# Patient Record
Sex: FEMALE
[De-identification: names, ages, dates, MRNs, and addresses within clinical notes are randomized per-mention and may not be internally consistent; named-entity substitution may affect disease eponyms.]

---

## 2018-07-12 ENCOUNTER — APPOINTMENT (OUTPATIENT)
Dept: ANTEPARTUM | Facility: CLINIC | Age: 33
End: 2018-07-12
Payer: COMMERCIAL

## 2018-07-12 ENCOUNTER — ASOB RESULT (OUTPATIENT)
Age: 33
End: 2018-07-12

## 2018-07-12 PROCEDURE — 76801 OB US < 14 WKS SINGLE FETUS: CPT

## 2018-07-12 PROCEDURE — 36416 COLLJ CAPILLARY BLOOD SPEC: CPT

## 2018-07-12 PROCEDURE — 76813 OB US NUCHAL MEAS 1 GEST: CPT

## 2018-08-31 ENCOUNTER — APPOINTMENT (OUTPATIENT)
Dept: ANTEPARTUM | Facility: CLINIC | Age: 33
End: 2018-08-31
Payer: COMMERCIAL

## 2018-08-31 ENCOUNTER — ASOB RESULT (OUTPATIENT)
Age: 33
End: 2018-08-31

## 2018-08-31 PROCEDURE — 76811 OB US DETAILED SNGL FETUS: CPT

## 2018-12-19 ENCOUNTER — ASOB RESULT (OUTPATIENT)
Age: 33
End: 2018-12-19

## 2018-12-19 ENCOUNTER — APPOINTMENT (OUTPATIENT)
Dept: ANTEPARTUM | Facility: CLINIC | Age: 33
End: 2018-12-19
Payer: COMMERCIAL

## 2018-12-19 PROCEDURE — 76819 FETAL BIOPHYS PROFIL W/O NST: CPT

## 2018-12-19 PROCEDURE — 76816 OB US FOLLOW-UP PER FETUS: CPT

## 2019-01-11 ENCOUNTER — ASOB RESULT (OUTPATIENT)
Age: 34
End: 2019-01-11

## 2019-01-11 ENCOUNTER — APPOINTMENT (OUTPATIENT)
Age: 34
End: 2019-01-11

## 2019-01-11 ENCOUNTER — APPOINTMENT (OUTPATIENT)
Dept: ANTEPARTUM | Facility: CLINIC | Age: 34
End: 2019-01-11
Payer: COMMERCIAL

## 2019-01-11 PROCEDURE — 76816 OB US FOLLOW-UP PER FETUS: CPT

## 2019-01-11 PROCEDURE — 76818 FETAL BIOPHYS PROFILE W/NST: CPT | Mod: 26

## 2019-01-15 ENCOUNTER — TRANSCRIPTION ENCOUNTER (OUTPATIENT)
Age: 34
End: 2019-01-15

## 2019-01-15 ENCOUNTER — INPATIENT (INPATIENT)
Facility: HOSPITAL | Age: 34
LOS: 1 days | Discharge: HOME | End: 2019-01-17
Attending: OBSTETRICS & GYNECOLOGY | Admitting: OBSTETRICS & GYNECOLOGY

## 2019-01-15 VITALS — HEIGHT: 65 IN | WEIGHT: 152.12 LBS

## 2019-01-15 DIAGNOSIS — Z3A.00 WEEKS OF GESTATION OF PREGNANCY NOT SPECIFIED: ICD-10-CM

## 2019-01-15 DIAGNOSIS — O26.90 PREGNANCY RELATED CONDITIONS, UNSPECIFIED, UNSPECIFIED TRIMESTER: ICD-10-CM

## 2019-01-15 LAB
BASOPHILS # BLD AUTO: 0.05 K/UL — SIGNIFICANT CHANGE UP (ref 0–0.2)
BASOPHILS NFR BLD AUTO: 0.4 % — SIGNIFICANT CHANGE UP (ref 0–2)
BLD GP AB SCN SERPL QL: NEGATIVE — SIGNIFICANT CHANGE UP
EOSINOPHIL # BLD AUTO: 0.02 K/UL — SIGNIFICANT CHANGE UP (ref 0–0.5)
EOSINOPHIL NFR BLD AUTO: 0.2 % — SIGNIFICANT CHANGE UP (ref 0–6)
HCT VFR BLD CALC: 34.3 % — LOW (ref 34.5–45)
HGB BLD-MCNC: 10.5 G/DL — LOW (ref 11.5–15.5)
IMM GRANULOCYTES NFR BLD AUTO: 0.8 % — SIGNIFICANT CHANGE UP (ref 0–1.5)
LYMPHOCYTES # BLD AUTO: 2.7 K/UL — SIGNIFICANT CHANGE UP (ref 1–3.3)
LYMPHOCYTES # BLD AUTO: 20.8 % — SIGNIFICANT CHANGE UP (ref 13–44)
MCHC RBC-ENTMCNC: 25.8 PG — LOW (ref 27–34)
MCHC RBC-ENTMCNC: 30.6 % — LOW (ref 32–36)
MCV RBC AUTO: 84.3 FL — SIGNIFICANT CHANGE UP (ref 80–100)
MONOCYTES # BLD AUTO: 0.85 K/UL — SIGNIFICANT CHANGE UP (ref 0–0.9)
MONOCYTES NFR BLD AUTO: 6.5 % — SIGNIFICANT CHANGE UP (ref 2–14)
NEUTROPHILS # BLD AUTO: 9.27 K/UL — HIGH (ref 1.8–7.4)
NEUTROPHILS NFR BLD AUTO: 71.3 % — SIGNIFICANT CHANGE UP (ref 43–77)
NRBC # FLD: 0 K/UL — LOW (ref 25–125)
PLATELET # BLD AUTO: 415 K/UL — HIGH (ref 150–400)
PMV BLD: 9.6 FL — SIGNIFICANT CHANGE UP (ref 7–13)
RBC # BLD: 4.07 M/UL — SIGNIFICANT CHANGE UP (ref 3.8–5.2)
RBC # FLD: 16.5 % — HIGH (ref 10.3–14.5)
RH IG SCN BLD-IMP: POSITIVE — SIGNIFICANT CHANGE UP
WBC # BLD: 12.99 K/UL — HIGH (ref 3.8–10.5)
WBC # FLD AUTO: 12.99 K/UL — HIGH (ref 3.8–10.5)

## 2019-01-15 RX ORDER — OXYTOCIN 10 UNIT/ML
41.67 VIAL (ML) INJECTION
Qty: 20 | Refills: 0 | Status: DISCONTINUED | OUTPATIENT
Start: 2019-01-15 | End: 2019-01-15

## 2019-01-15 RX ORDER — SODIUM CHLORIDE 9 MG/ML
3 INJECTION INTRAMUSCULAR; INTRAVENOUS; SUBCUTANEOUS EVERY 8 HOURS
Qty: 0 | Refills: 0 | Status: DISCONTINUED | OUTPATIENT
Start: 2019-01-15 | End: 2019-01-15

## 2019-01-15 RX ORDER — IBUPROFEN 200 MG
600 TABLET ORAL EVERY 6 HOURS
Qty: 0 | Refills: 0 | Status: COMPLETED | OUTPATIENT
Start: 2019-01-15 | End: 2019-12-14

## 2019-01-15 RX ORDER — MAGNESIUM HYDROXIDE 400 MG/1
30 TABLET, CHEWABLE ORAL
Qty: 0 | Refills: 0 | Status: DISCONTINUED | OUTPATIENT
Start: 2019-01-15 | End: 2019-01-17

## 2019-01-15 RX ORDER — GLYCERIN ADULT
1 SUPPOSITORY, RECTAL RECTAL AT BEDTIME
Qty: 0 | Refills: 0 | Status: DISCONTINUED | OUTPATIENT
Start: 2019-01-15 | End: 2019-01-17

## 2019-01-15 RX ORDER — SODIUM CHLORIDE 9 MG/ML
1000 INJECTION, SOLUTION INTRAVENOUS ONCE
Qty: 0 | Refills: 0 | Status: DISCONTINUED | OUTPATIENT
Start: 2019-01-15 | End: 2019-01-15

## 2019-01-15 RX ORDER — DOCUSATE SODIUM 100 MG
100 CAPSULE ORAL
Qty: 0 | Refills: 0 | Status: DISCONTINUED | OUTPATIENT
Start: 2019-01-15 | End: 2019-01-17

## 2019-01-15 RX ORDER — DIBUCAINE 1 %
1 OINTMENT (GRAM) RECTAL EVERY 4 HOURS
Qty: 0 | Refills: 0 | Status: DISCONTINUED | OUTPATIENT
Start: 2019-01-15 | End: 2019-01-17

## 2019-01-15 RX ORDER — LEVOTHYROXINE SODIUM 125 MCG
50 TABLET ORAL DAILY
Qty: 0 | Refills: 0 | Status: DISCONTINUED | OUTPATIENT
Start: 2019-01-15 | End: 2019-01-17

## 2019-01-15 RX ORDER — ACETAMINOPHEN 500 MG
975 TABLET ORAL EVERY 6 HOURS
Qty: 0 | Refills: 0 | Status: DISCONTINUED | OUTPATIENT
Start: 2019-01-15 | End: 2019-01-17

## 2019-01-15 RX ORDER — OXYCODONE HYDROCHLORIDE 5 MG/1
5 TABLET ORAL EVERY 4 HOURS
Qty: 0 | Refills: 0 | Status: DISCONTINUED | OUTPATIENT
Start: 2019-01-15 | End: 2019-01-17

## 2019-01-15 RX ORDER — KETOROLAC TROMETHAMINE 30 MG/ML
30 SYRINGE (ML) INJECTION ONCE
Qty: 0 | Refills: 0 | Status: DISCONTINUED | OUTPATIENT
Start: 2019-01-15 | End: 2019-01-15

## 2019-01-15 RX ORDER — SIMETHICONE 80 MG/1
80 TABLET, CHEWABLE ORAL EVERY 6 HOURS
Qty: 0 | Refills: 0 | Status: DISCONTINUED | OUTPATIENT
Start: 2019-01-15 | End: 2019-01-17

## 2019-01-15 RX ORDER — AMPICILLIN TRIHYDRATE 250 MG
2 CAPSULE ORAL ONCE
Qty: 0 | Refills: 0 | Status: DISCONTINUED | OUTPATIENT
Start: 2019-01-15 | End: 2019-01-15

## 2019-01-15 RX ORDER — HYDROCORTISONE 1 %
1 OINTMENT (GRAM) TOPICAL EVERY 4 HOURS
Qty: 0 | Refills: 0 | Status: DISCONTINUED | OUTPATIENT
Start: 2019-01-15 | End: 2019-01-17

## 2019-01-15 RX ORDER — HYDROCORTISONE 1 %
1 OINTMENT (GRAM) TOPICAL EVERY 4 HOURS
Qty: 0 | Refills: 0 | Status: DISCONTINUED | OUTPATIENT
Start: 2019-01-15 | End: 2019-01-15

## 2019-01-15 RX ORDER — ACETAMINOPHEN 500 MG
975 TABLET ORAL EVERY 6 HOURS
Qty: 0 | Refills: 0 | Status: COMPLETED | OUTPATIENT
Start: 2019-01-15 | End: 2019-12-14

## 2019-01-15 RX ORDER — IBUPROFEN 200 MG
600 TABLET ORAL EVERY 6 HOURS
Qty: 0 | Refills: 0 | Status: DISCONTINUED | OUTPATIENT
Start: 2019-01-15 | End: 2019-01-17

## 2019-01-15 RX ORDER — DIBUCAINE 1 %
1 OINTMENT (GRAM) RECTAL EVERY 4 HOURS
Qty: 0 | Refills: 0 | Status: DISCONTINUED | OUTPATIENT
Start: 2019-01-15 | End: 2019-01-15

## 2019-01-15 RX ORDER — SODIUM CHLORIDE 9 MG/ML
1000 INJECTION, SOLUTION INTRAVENOUS
Qty: 0 | Refills: 0 | Status: DISCONTINUED | OUTPATIENT
Start: 2019-01-15 | End: 2019-01-15

## 2019-01-15 RX ORDER — SODIUM CHLORIDE 9 MG/ML
3 INJECTION INTRAMUSCULAR; INTRAVENOUS; SUBCUTANEOUS EVERY 8 HOURS
Qty: 0 | Refills: 0 | Status: DISCONTINUED | OUTPATIENT
Start: 2019-01-15 | End: 2019-01-17

## 2019-01-15 RX ORDER — PRAMOXINE HYDROCHLORIDE 150 MG/15G
1 AEROSOL, FOAM RECTAL EVERY 4 HOURS
Qty: 0 | Refills: 0 | Status: DISCONTINUED | OUTPATIENT
Start: 2019-01-15 | End: 2019-01-17

## 2019-01-15 RX ORDER — LANOLIN
1 OINTMENT (GRAM) TOPICAL EVERY 6 HOURS
Qty: 0 | Refills: 0 | Status: DISCONTINUED | OUTPATIENT
Start: 2019-01-15 | End: 2019-01-17

## 2019-01-15 RX ORDER — AER TRAVELER 0.5 G/1
1 SOLUTION RECTAL; TOPICAL EVERY 4 HOURS
Qty: 0 | Refills: 0 | Status: DISCONTINUED | OUTPATIENT
Start: 2019-01-15 | End: 2019-01-15

## 2019-01-15 RX ORDER — OXYCODONE HYDROCHLORIDE 5 MG/1
5 TABLET ORAL
Qty: 0 | Refills: 0 | Status: DISCONTINUED | OUTPATIENT
Start: 2019-01-15 | End: 2019-01-17

## 2019-01-15 RX ORDER — TETANUS TOXOID, REDUCED DIPHTHERIA TOXOID AND ACELLULAR PERTUSSIS VACCINE, ADSORBED 5; 2.5; 8; 8; 2.5 [IU]/.5ML; [IU]/.5ML; UG/.5ML; UG/.5ML; UG/.5ML
0.5 SUSPENSION INTRAMUSCULAR ONCE
Qty: 0 | Refills: 0 | Status: DISCONTINUED | OUTPATIENT
Start: 2019-01-15 | End: 2019-01-17

## 2019-01-15 RX ORDER — DIPHENHYDRAMINE HCL 50 MG
25 CAPSULE ORAL EVERY 6 HOURS
Qty: 0 | Refills: 0 | Status: DISCONTINUED | OUTPATIENT
Start: 2019-01-15 | End: 2019-01-17

## 2019-01-15 RX ORDER — PRAMOXINE HYDROCHLORIDE 150 MG/15G
1 AEROSOL, FOAM RECTAL EVERY 4 HOURS
Qty: 0 | Refills: 0 | Status: DISCONTINUED | OUTPATIENT
Start: 2019-01-15 | End: 2019-01-15

## 2019-01-15 RX ORDER — AMPICILLIN TRIHYDRATE 250 MG
CAPSULE ORAL
Qty: 0 | Refills: 0 | Status: DISCONTINUED | OUTPATIENT
Start: 2019-01-15 | End: 2019-01-15

## 2019-01-15 RX ORDER — AER TRAVELER 0.5 G/1
1 SOLUTION RECTAL; TOPICAL EVERY 4 HOURS
Qty: 0 | Refills: 0 | Status: DISCONTINUED | OUTPATIENT
Start: 2019-01-15 | End: 2019-01-17

## 2019-01-15 RX ORDER — OXYTOCIN 10 UNIT/ML
333.33 VIAL (ML) INJECTION
Qty: 20 | Refills: 0 | Status: DISCONTINUED | OUTPATIENT
Start: 2019-01-15 | End: 2019-01-15

## 2019-01-15 RX ORDER — AMPICILLIN TRIHYDRATE 250 MG
1 CAPSULE ORAL EVERY 4 HOURS
Qty: 0 | Refills: 0 | Status: DISCONTINUED | OUTPATIENT
Start: 2019-01-15 | End: 2019-01-15

## 2019-01-15 RX ADMIN — Medication 125 MILLIUNIT(S)/MIN: at 07:50

## 2019-01-15 RX ADMIN — Medication 975 MILLIGRAM(S): at 18:13

## 2019-01-15 RX ADMIN — Medication 600 MILLIGRAM(S): at 18:12

## 2019-01-15 RX ADMIN — Medication 1 APPLICATION(S): at 11:29

## 2019-01-15 RX ADMIN — Medication 50 MICROGRAM(S): at 08:40

## 2019-01-15 RX ADMIN — Medication 30 MILLIGRAM(S): at 06:55

## 2019-01-15 RX ADMIN — Medication 600 MILLIGRAM(S): at 17:11

## 2019-01-15 RX ADMIN — PRAMOXINE HYDROCHLORIDE 1 APPLICATION(S): 150 AEROSOL, FOAM RECTAL at 11:30

## 2019-01-15 RX ADMIN — Medication 30 MILLIGRAM(S): at 07:50

## 2019-01-15 RX ADMIN — Medication 1 APPLICATION(S): at 11:30

## 2019-01-15 RX ADMIN — SODIUM CHLORIDE 3 MILLILITER(S): 9 INJECTION INTRAMUSCULAR; INTRAVENOUS; SUBCUTANEOUS at 22:00

## 2019-01-15 RX ADMIN — Medication 975 MILLIGRAM(S): at 17:10

## 2019-01-15 NOTE — DISCHARGE NOTE OB - ABILITY TO HEAR (WITH HEARING AID OR HEARING APPLIANCE IF NORMALLY USED):
Faxed refill request from: [facility]   Medication requested: Hydroxychloroquine   Prescription Written: 2/01/2016  Last filled: 12/26/2016  Last qty: 30 with five refills   Pt's last office visit: 2/01/2016  Next scheduled office visit: 02/06/2017  Last ophthalmology visit: 11/29/2016      WBC   Date Value Ref Range Status   09/02/2015 7.1 4.0 - 11.0 10e9/L Final     RBC COUNT   Date Value Ref Range Status   09/02/2015 3.20* 3.8 - 5.2 10e12/L Final     HEMOGLOBIN   Date Value Ref Range Status   12/15/2016 12.3 11.7 - 15.7 g/dL Final     HEMATOCRIT   Date Value Ref Range Status   09/02/2015 29.8* 35.0 - 47.0 % Final     MCV   Date Value Ref Range Status   09/02/2015 93 78 - 100 fl Final     MCH   Date Value Ref Range Status   09/02/2015 30.3 26.5 - 33.0 pg Final     MCHC   Date Value Ref Range Status   09/02/2015 32.6 31.5 - 36.5 g/dL Final     RDW   Date Value Ref Range Status   09/02/2015 14.7 10.0 - 15.0 % Final     PLATELET COUNT   Date Value Ref Range Status   09/02/2015 326 150 - 450 10e9/L Final     AST   Date Value Ref Range Status   08/22/2015 26 0 - 45 U/L Final     ALT   Date Value Ref Range Status   08/22/2015 22 0 - 50 U/L Final     CREATININE   Date Value Ref Range Status   12/27/2016 1.03 0.52 - 1.04 mg/dL Final   04/07/2005 0.93 0.60 - 1.30 mg/dL Final     ALBUMIN   Date Value Ref Range Status   12/15/2016 3.4 3.4 - 5.0 g/dL Final     COLOR URINE (no units)   Date Value   12/27/2016 Yellow     APPEARANCE URINE (no units)   Date Value   12/27/2016 Clear     GLUCOSE URINE (mg/dL)   Date Value   12/27/2016 Negative     BILIRUBIN URINE (no units)   Date Value   12/27/2016 Negative     KETONES URINE (mg/dL)   Date Value   12/27/2016 Negative     SPECIFIC GRAVITY URINE (no units)   Date Value   12/27/2016 1.025     PH URINE (pH)   Date Value   12/27/2016 6.5     PROTEIN ALBUMIN URINE (mg/dL)   Date Value   12/27/2016 30*     UROBILINOGEN URINE (EU/dL)   Date Value   12/27/2016 0.2     NITRITE URINE  (no units)   Date Value   12/27/2016 Negative     LEUKOCYTE ESTERASE URINE (no units)   Date Value   12/27/2016 Negative                Adequate: hears normal conversation without difficulty

## 2019-01-15 NOTE — DISCHARGE NOTE OB - CARE PLAN
Principal Discharge DX:	Vaginal delivery  Goal:	return to normal ADLs  Assessment and plan of treatment:	stable; vaginal rest

## 2019-01-15 NOTE — DISCHARGE NOTE OB - PATIENT PORTAL LINK FT
You can access the [facility] Portal, offered by [facility], by registering with the following website: [email or web address]

## 2019-01-15 NOTE — DISCHARGE NOTE OB - CARE PROVIDER_API CALL
Carolyn De Paz), Obstetrics and Gynecology  [address] [place] [address]  Phone: [phone]  Fax: [phone]

## 2019-01-16 LAB — T PALLIDUM AB TITR SER: NEGATIVE — SIGNIFICANT CHANGE UP

## 2019-01-16 RX ADMIN — Medication 975 MILLIGRAM(S): at 22:00

## 2019-01-16 RX ADMIN — Medication 600 MILLIGRAM(S): at 00:00

## 2019-01-16 RX ADMIN — Medication 600 MILLIGRAM(S): at 16:00

## 2019-01-16 RX ADMIN — Medication 975 MILLIGRAM(S): at 00:00

## 2019-01-16 RX ADMIN — Medication 975 MILLIGRAM(S): at 23:00

## 2019-01-16 RX ADMIN — Medication 600 MILLIGRAM(S): at 00:30

## 2019-01-16 RX ADMIN — Medication 600 MILLIGRAM(S): at 08:45

## 2019-01-16 RX ADMIN — Medication 975 MILLIGRAM(S): at 15:12

## 2019-01-16 RX ADMIN — Medication 600 MILLIGRAM(S): at 15:12

## 2019-01-16 RX ADMIN — Medication 600 MILLIGRAM(S): at 09:30

## 2019-01-16 RX ADMIN — Medication 1 TABLET(S): at 18:43

## 2019-01-16 RX ADMIN — Medication 50 MICROGRAM(S): at 07:48

## 2019-01-16 RX ADMIN — Medication 600 MILLIGRAM(S): at 23:00

## 2019-01-16 RX ADMIN — Medication 975 MILLIGRAM(S): at 09:30

## 2019-01-16 RX ADMIN — Medication 975 MILLIGRAM(S): at 08:45

## 2019-01-16 RX ADMIN — SODIUM CHLORIDE 3 MILLILITER(S): 9 INJECTION INTRAMUSCULAR; INTRAVENOUS; SUBCUTANEOUS at 05:52

## 2019-01-16 RX ADMIN — Medication 975 MILLIGRAM(S): at 00:30

## 2019-01-16 RX ADMIN — Medication 600 MILLIGRAM(S): at 22:00

## 2019-01-16 RX ADMIN — Medication 975 MILLIGRAM(S): at 16:00

## 2019-01-16 NOTE — LACTATION INITIAL EVALUATION - LACTATION INTERVENTIONS
Pt. declining assistance at this time. Pt. states she is doing both breast and bottle feeding. Recommended more breastfeeding and less formula feeding. Supplementation risks discussed. Strategies reviewed on getting baby on breast more often. Instructed mother in use of breastfeeding log to document feeds along with wet and dirty diapers. Verbalized understanding. Pt. informed of availability of lactation through the night and encouraged to call for assistance prn.

## 2019-01-17 VITALS
HEART RATE: 74 BPM | SYSTOLIC BLOOD PRESSURE: 113 MMHG | DIASTOLIC BLOOD PRESSURE: 73 MMHG | RESPIRATION RATE: 18 BRPM | OXYGEN SATURATION: 100 % | TEMPERATURE: 98 F

## 2019-01-17 RX ORDER — IBUPROFEN 200 MG
1 TABLET ORAL
Qty: 0 | Refills: 0 | COMMUNITY
Start: 2019-01-17

## 2019-01-17 RX ORDER — ACETAMINOPHEN 500 MG
3 TABLET ORAL
Qty: 0 | Refills: 0 | COMMUNITY
Start: 2019-01-17

## 2019-01-17 RX ADMIN — Medication 975 MILLIGRAM(S): at 07:34

## 2019-01-17 RX ADMIN — Medication 600 MILLIGRAM(S): at 07:34

## 2019-01-17 RX ADMIN — Medication 975 MILLIGRAM(S): at 06:34

## 2019-01-17 RX ADMIN — Medication 600 MILLIGRAM(S): at 06:34

## 2019-01-17 NOTE — PROGRESS NOTE ADULT - SUBJECTIVE AND OBJECTIVE BOX
Patient assessed at 0732.  Subjective  Pain: Patient report body soreness that is being managed well by pain management protocol  Complaints:  None  Milestones: Alert and orientedx3. Out of bed ambulating. Voiding. Positive bowel movement. Tolerating a regular diet.  Infant feeding:     breastfeeding                       Feeding related issues or concerns: had issues with latching with last child due to slightly flat nipples. Patient states she ended up expressing breastmilk with breast pump. Patient refused lactation at this time.    Objective   Vital Signs:  Vital Signs Last 24 Hrs  T(C): 36.9 (2019 06:27), Max: 36.9 (15 Simon 2019 14:00)  T(F): 98.4 (2019 06:27), Max: 98.4 (15 Simon 2019 14:00)  HR: 67 (2019 06:27) (67 - 96)  BP: 91/66 (2019 06:27) (91/66 - 111/66)  BP(mean): --  RR: 18 (2019 06:27) (18 - 18)  SpO2: 100% (2019 06:27) (98% - 100%)    Labs:                        10.5   12.99 )-----------( 415      ( 15 Simon 2019 06:00 )             34.3     Blood Type: Apositive  Rubella: Immune  RPR: nonreactive    Medications  MEDICATIONS  (STANDING):  acetaminophen   Tablet .. 975 milliGRAM(s) Oral every 6 hours  diphtheria/tetanus/pertussis (acellular) Vaccine (ADAcel) 0.5 milliLiter(s) IntraMuscular once  ibuprofen  Tablet. 600 milliGRAM(s) Oral every 6 hours  levothyroxine 50 MICROGram(s) Oral daily  oxyCODONE    IR 5 milliGRAM(s) Oral every 3 hours  prenatal multivitamin 1 Tablet(s) Oral daily  sodium chloride 0.9% lock flush 3 milliLiter(s) IV Push every 8 hours    MEDICATIONS  (PRN):  dibucaine 1% Ointment 1 Application(s) Topical every 4 hours PRN Perineal Discomfort  diphenhydrAMINE 25 milliGRAM(s) Oral every 6 hours PRN Itching  docusate sodium 100 milliGRAM(s) Oral two times a day PRN Stool Softening  glycerin Suppository - Adult 1 Suppository(s) Rectal at bedtime PRN Constipation  hydrocortisone 1% Cream 1 Application(s) Topical every 4 hours PRN perineal discomfort  lanolin Ointment 1 Application(s) Topical every 6 hours PRN Sore Nipples  magnesium hydroxide Suspension 30 milliLiter(s) Oral two times a day PRN Constipation  oxyCODONE    IR 5 milliGRAM(s) Oral every 4 hours PRN Severe Pain (7 -10)  pramoxine 1%/zinc 5% Cream 1 Application(s) Topical every 4 hours PRN perineal discomfort  simethicone 80 milliGRAM(s) Chew every 6 hours PRN Gas  witch hazel Pads 1 Application(s) Topical every 4 hours PRN Perineal Discomfort    Assessment  32y/o . Day #1 @ 0645  postpartum .  Assisted delivery (vacuum, forceps): vacuum  Indication for assisted delivery: abnormal FHR Cat II  Past medical history significant for hypothyroidism on synthroid  Current Issues: EBL 350cc  Lung sound clear bilaterally  Breasts: soft, nontender  Nipples: intact, slightly flat  Abdomen: Soft, nontender, nondistended. Fundus firm. Positive bowel sounds  Vagina: Lochia light rubra  Perineum:  Slight edema with no erythema noted  Laceration/episiotomy site: 2nd degree laceration  Lower extremities: No edema noted bilaterally of lower extremities. Nontender calves.  Negative Stephanie's sign. Positive pedal pulses.  Other relevant physical exam findings: none      Plan  Continue routine postpartum care.   Increase ambulation, analgesia PRN and pain medication protocol standing oxycodone, ibuprofen and acetaminophen.  Discussed breast/nipple care for a breastfeeding mother.   Discussed pericare and sitz bath.
Patient assessed at 0740.  Subjective  Pain: Patient report body soreness that is being managed well by pain management protocol  Complaints:  None  Milestones: Alert and orientedx3. Out of bed ambulating. Voiding. Positive bowel movement. Tolerating a regular diet.  Infant feeding:     breastfeeding  and formula feeding                     Feeding related issues or concerns: had issues with latching with last child due to slightly flat nipples. Patient states she hasnt latch infant onto breast, but will try to breastfeeding when see gets home.     Vital Signs Last 24 Hrs  T(C): 36.6 (2019 04:51), Max: 36.7 (2019 17:34)  T(F): 97.9 (2019 04:51), Max: 98.1 (2019 17:34)  HR: 74 (2019 04:51) (74 - 83)  BP: 113/73 (2019 04:51) (108/77 - 113/73)  BP(mean): --  RR: 18 (2019 04:51) (18 - 18)  SpO2: 100% (2019 04:51) (100% - 100%)    Labs:                        10.5   12.99 )-----------( 415      ( 15 Simon 2019 06:00 )             34.3     Blood Type: Apositive  Rubella: Immune  RPR: nonreactive    MEDICATIONS  (STANDING):  acetaminophen   Tablet .. 975 milliGRAM(s) Oral every 6 hours  diphtheria/tetanus/pertussis (acellular) Vaccine (ADAcel) 0.5 milliLiter(s) IntraMuscular once  ibuprofen  Tablet. 600 milliGRAM(s) Oral every 6 hours  levothyroxine 50 MICROGram(s) Oral daily  oxyCODONE    IR 5 milliGRAM(s) Oral every 3 hours  prenatal multivitamin 1 Tablet(s) Oral daily  sodium chloride 0.9% lock flush 3 milliLiter(s) IV Push every 8 hours    MEDICATIONS  (PRN):  dibucaine 1% Ointment 1 Application(s) Topical every 4 hours PRN Perineal Discomfort  diphenhydrAMINE 25 milliGRAM(s) Oral every 6 hours PRN Itching  docusate sodium 100 milliGRAM(s) Oral two times a day PRN Stool Softening  glycerin Suppository - Adult 1 Suppository(s) Rectal at bedtime PRN Constipation  hydrocortisone 1% Cream 1 Application(s) Topical every 4 hours PRN perineal discomfort  lanolin Ointment 1 Application(s) Topical every 6 hours PRN Sore Nipples  magnesium hydroxide Suspension 30 milliLiter(s) Oral two times a day PRN Constipation  oxyCODONE    IR 5 milliGRAM(s) Oral every 4 hours PRN Severe Pain (7 -10)  pramoxine 1%/zinc 5% Cream 1 Application(s) Topical every 4 hours PRN perineal discomfort  simethicone 80 milliGRAM(s) Chew every 6 hours PRN Gas  witch hazel Pads 1 Application(s) Topical every 4 hours PRN Perineal Discomfort      Assessment  32y/o . Day #2 postpartum .  Assisted delivery (vacuum, forceps): vacuum  Indication for assisted delivery: abnormal FHR Cat II  Past medical history significant for hypothyroidism on synthroid  Current Issues: EBL 350cc  Lung sound clear bilaterally  Breasts: slightly chapin, nontender  Nipples: intact, slightly flat  Abdomen: Soft, nontender, nondistended. Fundus firm. Positive bowel sounds  Vagina: Lochia light rubra  Perineum:  Slight edema with no erythema noted  Laceration/episiotomy site: 2nd degree laceration  Lower extremities: No edema noted bilaterally of lower extremities. Nontender calves.  Negative Stephanie's sign. Positive pedal pulses.  Other relevant physical exam findings: none      Plan  Continue routine postpartum care.   Increase ambulation, analgesia PRN and pain medication protocol standing oxycodone, ibuprofen and acetaminophen.  Discussed breast/nipple/engorgement care for a breastfeeding mother.   Discussed pericare and sitz bath.   Discharge to home today. Discussed discharge planning.
Subjective  Pain: none  Complaints:none  Milestones: Alert and orientedx3. Out of bed ambulating. Voiding. Tolerating a regular diet.  Infant feeding:                                 Feeding related issues or concerns:none    Objective   T(C): 36.9 (01-16-19 @ 06:27), Max: 36.9 (01-15-19 @ 14:00)  HR: 67 (01-16-19 @ 06:27) (67 - 85)  BP: 91/66 (01-16-19 @ 06:27) (91/66 - 111/60)  RR: 18 (01-16-19 @ 06:27) (18 - 18)  SpO2: 100% (01-16-19 @ 06:27) (99% - 100%)  Wt(kg): --    Breasts: soft, non-tender; no engorgement  Abd: Fundus firm; non-tender  Lochia:moderate  Lower extremities: no cyanosis, clubbing, or edema    LABS:                        10.5   12.99 )-----------( 415      ( 15 Simon 2019 06:00 )             34.3     ABO Interpretation: A (01-15 @ 05:24)  Rh Interpretation: Positive (01-15 @ 05:24)          Plan: Increase ambulation, analgesia PRN and pain medication protocol standing oxycodone, ibuprofen and acetaminophen.  Diet: Continue regular diet      Continue routine postpartum care.

## 2021-01-14 ENCOUNTER — RESULT REVIEW (OUTPATIENT)
Age: 36
End: 2021-01-14

## 2021-06-04 ENCOUNTER — APPOINTMENT (OUTPATIENT)
Dept: MAMMOGRAPHY | Facility: IMAGING CENTER | Age: 36
End: 2021-06-04
Payer: COMMERCIAL

## 2021-06-04 ENCOUNTER — OUTPATIENT (OUTPATIENT)
Dept: OUTPATIENT SERVICES | Facility: HOSPITAL | Age: 36
LOS: 1 days | End: 2021-06-04
Payer: COMMERCIAL

## 2021-06-04 DIAGNOSIS — Z00.8 ENCOUNTER FOR OTHER GENERAL EXAMINATION: ICD-10-CM

## 2021-06-04 PROCEDURE — 77063 BREAST TOMOSYNTHESIS BI: CPT | Mod: 26

## 2021-06-04 PROCEDURE — 77067 SCR MAMMO BI INCL CAD: CPT

## 2021-06-04 PROCEDURE — 77067 SCR MAMMO BI INCL CAD: CPT | Mod: 26

## 2021-06-04 PROCEDURE — 77063 BREAST TOMOSYNTHESIS BI: CPT

## 2021-07-06 ENCOUNTER — APPOINTMENT (OUTPATIENT)
Dept: ULTRASOUND IMAGING | Facility: IMAGING CENTER | Age: 36
End: 2021-07-06
Payer: COMMERCIAL

## 2021-07-06 ENCOUNTER — OUTPATIENT (OUTPATIENT)
Dept: OUTPATIENT SERVICES | Facility: HOSPITAL | Age: 36
LOS: 1 days | End: 2021-07-06
Payer: COMMERCIAL

## 2021-07-06 DIAGNOSIS — Z00.8 ENCOUNTER FOR OTHER GENERAL EXAMINATION: ICD-10-CM

## 2021-07-06 PROCEDURE — 76641 ULTRASOUND BREAST COMPLETE: CPT | Mod: 26,50

## 2021-07-06 PROCEDURE — 76641 ULTRASOUND BREAST COMPLETE: CPT

## 2023-04-10 NOTE — DISCHARGE NOTE OB - EAT A WELL BALANCED DIET INCLUDING PROTEINS (LEAN MEATS, POULTRY, FISH AND BEANS), FRESH FRUIT OR JUICE, FRESH VEGETABLES, AND DAIRY PRODUCTS
Pt should have refills on file. Called [facility] pharmacy and they are expecting medication be delivered to them tomorrow and will get ready for pt pickup. Notified pt.   Statement Selected

## 2023-06-21 ENCOUNTER — APPOINTMENT (OUTPATIENT)
Dept: ULTRASOUND IMAGING | Facility: CLINIC | Age: 38
End: 2023-06-21
Payer: COMMERCIAL

## 2023-06-21 ENCOUNTER — APPOINTMENT (OUTPATIENT)
Dept: MAMMOGRAPHY | Facility: CLINIC | Age: 38
End: 2023-06-21
Payer: COMMERCIAL

## 2023-06-21 PROCEDURE — G0279: CPT

## 2023-06-21 PROCEDURE — 76641 ULTRASOUND BREAST COMPLETE: CPT | Mod: 50

## 2023-06-21 PROCEDURE — 77066 DX MAMMO INCL CAD BI: CPT

## 2024-05-30 ENCOUNTER — OUTPATIENT (OUTPATIENT)
Dept: OUTPATIENT SERVICES | Facility: HOSPITAL | Age: 39
LOS: 1 days | End: 2024-05-30
Payer: COMMERCIAL

## 2024-05-30 ENCOUNTER — APPOINTMENT (OUTPATIENT)
Dept: ULTRASOUND IMAGING | Facility: IMAGING CENTER | Age: 39
End: 2024-05-30
Payer: COMMERCIAL

## 2024-05-30 DIAGNOSIS — Z00.8 ENCOUNTER FOR OTHER GENERAL EXAMINATION: ICD-10-CM

## 2024-05-30 PROCEDURE — 76882 US LMTD JT/FCL EVL NVASC XTR: CPT

## 2024-05-30 PROCEDURE — 76882 US LMTD JT/FCL EVL NVASC XTR: CPT | Mod: 26,LT
